# Patient Record
Sex: FEMALE | Race: WHITE | NOT HISPANIC OR LATINO | URBAN - METROPOLITAN AREA
[De-identification: names, ages, dates, MRNs, and addresses within clinical notes are randomized per-mention and may not be internally consistent; named-entity substitution may affect disease eponyms.]

---

## 2024-08-20 ENCOUNTER — OFFICE VISIT (OUTPATIENT)
Dept: ORTHOPEDIC SURGERY | Facility: CLINIC | Age: 39
End: 2024-08-20
Payer: COMMERCIAL

## 2024-08-20 DIAGNOSIS — S86.012A ACHILLES TENDON TEAR, LEFT, INITIAL ENCOUNTER: ICD-10-CM

## 2024-08-20 DIAGNOSIS — S86.112A GASTROCNEMIUS STRAIN, LEFT, INITIAL ENCOUNTER: ICD-10-CM

## 2024-08-20 PROCEDURE — 99213 OFFICE O/P EST LOW 20 MIN: CPT | Performed by: ORTHOPAEDIC SURGERY

## 2024-08-20 PROCEDURE — L4361 PNEUMA/VAC WALK BOOT PRE OTS: HCPCS | Performed by: ORTHOPAEDIC SURGERY

## 2024-08-20 PROCEDURE — L3332 SHOE LIFTS TAPERED TO ONE-HA: HCPCS | Performed by: ORTHOPAEDIC SURGERY

## 2024-08-20 NOTE — H&P (VIEW-ONLY)
"  History of Present Illness  The patient presents with pain and swelling in the posterior aspect of the left ankle.  The patient noted a \"pop\" during the traumatic event.  Ambulation was difficult after.  The patient denies antecedent symptoms.  The patient denies and prior DVT or shortness of breath.  Pain is described as moderate, sharp / worse with activity and better with rest.    She does endorse some tendinitis prior to this which she has been working on stretching and anti-inflammatories.    Past Medical History:   Diagnosis Date    Other fecal abnormalities 11/08/2018    Loose stools     Past Surgical History:   Procedure Laterality Date    OTHER SURGICAL HISTORY  05/16/2019    Clavicle fracture repair     No current outpatient medications on file.    Review of Systems   GENERAL: Negative for malaise, significant weight loss, fever  MUSCULOSKELETAL: see HPI  NEURO:  Negative    Physical Examination:  Left Ankle:  Skin healthy and intact  Swelling and ecchymosis noted  No tenderness to palpation: medial/lateral malleoli, lisfranc joint  + tenderness to palpation over Achilles   Palpable defect noted near muscle tendon junction    Passive ROM - dorsi/plantar flexion, inversion, eversion: within normal limits  Art test demonstrates lack of ankle flexion   Neurovascular exam normal distally  2+ dorsalis pedis pulse and good cap refill    Radiographs:   Deferred    Assessment:  Patient with a left Achilles tendon rupture    Plan:  We discussed Achilles tendon ruptures with the patient.   We reviewed surgical versus non-surgical treatment, including the re-rupture rate, risk of complications (DVT, infection, wound issues), functional outcomes and anticipated return to activities.      Discussed MRI done urgently for further evaluation.    Boot today with heel wedge.        "

## 2024-08-21 ENCOUNTER — HOSPITAL ENCOUNTER (OUTPATIENT)
Dept: RADIOLOGY | Facility: CLINIC | Age: 39
Discharge: HOME | End: 2024-08-21
Payer: COMMERCIAL

## 2024-08-21 DIAGNOSIS — S86.112A GASTROCNEMIUS STRAIN, LEFT, INITIAL ENCOUNTER: ICD-10-CM

## 2024-08-21 DIAGNOSIS — S86.012A ACHILLES TENDON TEAR, LEFT, INITIAL ENCOUNTER: ICD-10-CM

## 2024-08-21 PROCEDURE — 73721 MRI JNT OF LWR EXTRE W/O DYE: CPT | Mod: LT

## 2024-08-27 ENCOUNTER — TELEPHONE (OUTPATIENT)
Dept: ORTHOPEDIC SURGERY | Facility: CLINIC | Age: 39
End: 2024-08-27
Payer: COMMERCIAL

## 2024-08-27 DIAGNOSIS — S86.012A ACHILLES TENDON TEAR, LEFT, INITIAL ENCOUNTER: Primary | ICD-10-CM

## 2024-08-27 NOTE — TELEPHONE ENCOUNTER
Patient's MRI demonstrates a full-thickness tear of the more proximal aspect of the left Achilles tendon.    We discussed Achilles tendon ruptures with the patient.   We reviewed surgical versus non-surgical treatment, including the re-rupture rate, risk of complications (DVT, infection, wound issues), functional outcomes and anticipated return to activities.  The surgical procedure was discussed.  If the patient would like to proceed we will proceed with pre-operative risk stratification.  In the interim; elevation, ice, rest, crutches encouraged.    Based on her age and activity level like to proceed with surgical intervention.

## 2024-08-28 ENCOUNTER — APPOINTMENT (OUTPATIENT)
Dept: ORTHOPEDIC SURGERY | Facility: CLINIC | Age: 39
End: 2024-08-28
Payer: COMMERCIAL

## 2024-08-29 RX ORDER — SODIUM CHLORIDE, SODIUM LACTATE, POTASSIUM CHLORIDE, CALCIUM CHLORIDE 600; 310; 30; 20 MG/100ML; MG/100ML; MG/100ML; MG/100ML
20 INJECTION, SOLUTION INTRAVENOUS CONTINUOUS
Status: CANCELLED | OUTPATIENT
Start: 2024-08-29

## 2024-08-29 RX ORDER — CEFAZOLIN SODIUM 2 G/100ML
2 INJECTION, SOLUTION INTRAVENOUS ONCE
Status: CANCELLED | OUTPATIENT
Start: 2024-08-29 | End: 2024-08-29

## 2024-08-30 ENCOUNTER — HOSPITAL ENCOUNTER (OUTPATIENT)
Facility: HOSPITAL | Age: 39
Setting detail: OUTPATIENT SURGERY
Discharge: HOME | End: 2024-08-30
Attending: ORTHOPAEDIC SURGERY | Admitting: ORTHOPAEDIC SURGERY
Payer: COMMERCIAL

## 2024-08-30 ENCOUNTER — ANESTHESIA (OUTPATIENT)
Dept: OPERATING ROOM | Facility: HOSPITAL | Age: 39
End: 2024-08-30
Payer: COMMERCIAL

## 2024-08-30 ENCOUNTER — ANESTHESIA EVENT (OUTPATIENT)
Dept: OPERATING ROOM | Facility: HOSPITAL | Age: 39
End: 2024-08-30
Payer: COMMERCIAL

## 2024-08-30 VITALS
HEIGHT: 69 IN | RESPIRATION RATE: 16 BRPM | SYSTOLIC BLOOD PRESSURE: 129 MMHG | TEMPERATURE: 97.2 F | OXYGEN SATURATION: 100 % | HEART RATE: 51 BPM | DIASTOLIC BLOOD PRESSURE: 66 MMHG | WEIGHT: 180 LBS | BODY MASS INDEX: 26.66 KG/M2

## 2024-08-30 DIAGNOSIS — S86.012A STRAIN OF LEFT ACHILLES TENDON, INITIAL ENCOUNTER: Primary | ICD-10-CM

## 2024-08-30 DIAGNOSIS — G89.18 POST-OPERATIVE PAIN: ICD-10-CM

## 2024-08-30 LAB — HCG UR QL IA.RAPID: NEGATIVE

## 2024-08-30 PROCEDURE — 2500000004 HC RX 250 GENERAL PHARMACY W/ HCPCS (ALT 636 FOR OP/ED): Mod: JZ | Performed by: STUDENT IN AN ORGANIZED HEALTH CARE EDUCATION/TRAINING PROGRAM

## 2024-08-30 PROCEDURE — 3600000003 HC OR TIME - INITIAL BASE CHARGE - PROCEDURE LEVEL THREE: Performed by: ORTHOPAEDIC SURGERY

## 2024-08-30 PROCEDURE — 27650 REPAIR ACHILLES TENDON: CPT | Performed by: ORTHOPAEDIC SURGERY

## 2024-08-30 PROCEDURE — 2500000004 HC RX 250 GENERAL PHARMACY W/ HCPCS (ALT 636 FOR OP/ED): Mod: JZ | Performed by: ORTHOPAEDIC SURGERY

## 2024-08-30 PROCEDURE — 3700000002 HC GENERAL ANESTHESIA TIME - EACH INCREMENTAL 1 MINUTE: Performed by: ORTHOPAEDIC SURGERY

## 2024-08-30 PROCEDURE — 3700000001 HC GENERAL ANESTHESIA TIME - INITIAL BASE CHARGE: Performed by: ORTHOPAEDIC SURGERY

## 2024-08-30 PROCEDURE — 2500000004 HC RX 250 GENERAL PHARMACY W/ HCPCS (ALT 636 FOR OP/ED): Performed by: NURSE ANESTHETIST, CERTIFIED REGISTERED

## 2024-08-30 PROCEDURE — 2720000007 HC OR 272 NO HCPCS: Performed by: ORTHOPAEDIC SURGERY

## 2024-08-30 PROCEDURE — 81025 URINE PREGNANCY TEST: CPT | Performed by: ORTHOPAEDIC SURGERY

## 2024-08-30 PROCEDURE — 7100000009 HC PHASE TWO TIME - INITIAL BASE CHARGE: Performed by: ORTHOPAEDIC SURGERY

## 2024-08-30 PROCEDURE — 7100000010 HC PHASE TWO TIME - EACH INCREMENTAL 1 MINUTE: Performed by: ORTHOPAEDIC SURGERY

## 2024-08-30 PROCEDURE — 27650 REPAIR ACHILLES TENDON: CPT | Performed by: STUDENT IN AN ORGANIZED HEALTH CARE EDUCATION/TRAINING PROGRAM

## 2024-08-30 PROCEDURE — 7100000002 HC RECOVERY ROOM TIME - EACH INCREMENTAL 1 MINUTE: Performed by: ORTHOPAEDIC SURGERY

## 2024-08-30 PROCEDURE — 3600000008 HC OR TIME - EACH INCREMENTAL 1 MINUTE - PROCEDURE LEVEL THREE: Performed by: ORTHOPAEDIC SURGERY

## 2024-08-30 PROCEDURE — 7100000001 HC RECOVERY ROOM TIME - INITIAL BASE CHARGE: Performed by: ORTHOPAEDIC SURGERY

## 2024-08-30 PROCEDURE — 2500000005 HC RX 250 GENERAL PHARMACY W/O HCPCS: Performed by: NURSE ANESTHETIST, CERTIFIED REGISTERED

## 2024-08-30 RX ORDER — OXYCODONE AND ACETAMINOPHEN 5; 325 MG/1; MG/1
1 TABLET ORAL EVERY 4 HOURS PRN
Status: DISCONTINUED | OUTPATIENT
Start: 2024-08-30 | End: 2024-08-30 | Stop reason: HOSPADM

## 2024-08-30 RX ORDER — BUPIVACAINE HYDROCHLORIDE 5 MG/ML
INJECTION, SOLUTION EPIDURAL; INTRACAUDAL AS NEEDED
Status: DISCONTINUED | OUTPATIENT
Start: 2024-08-30 | End: 2024-08-30 | Stop reason: HOSPADM

## 2024-08-30 RX ORDER — ONDANSETRON HYDROCHLORIDE 2 MG/ML
INJECTION, SOLUTION INTRAVENOUS AS NEEDED
Status: DISCONTINUED | OUTPATIENT
Start: 2024-08-30 | End: 2024-08-30

## 2024-08-30 RX ORDER — SODIUM CHLORIDE, SODIUM LACTATE, POTASSIUM CHLORIDE, CALCIUM CHLORIDE 600; 310; 30; 20 MG/100ML; MG/100ML; MG/100ML; MG/100ML
100 INJECTION, SOLUTION INTRAVENOUS CONTINUOUS
Status: DISCONTINUED | OUTPATIENT
Start: 2024-08-30 | End: 2024-08-30 | Stop reason: HOSPADM

## 2024-08-30 RX ORDER — NEOSTIGMINE METHYLSULFATE 1 MG/ML
INJECTION INTRAVENOUS AS NEEDED
Status: DISCONTINUED | OUTPATIENT
Start: 2024-08-30 | End: 2024-08-30

## 2024-08-30 RX ORDER — HYDRALAZINE HYDROCHLORIDE 20 MG/ML
5 INJECTION INTRAMUSCULAR; INTRAVENOUS EVERY 30 MIN PRN
Status: DISCONTINUED | OUTPATIENT
Start: 2024-08-30 | End: 2024-08-30 | Stop reason: HOSPADM

## 2024-08-30 RX ORDER — ASPIRIN 81 MG/1
81 TABLET ORAL 2 TIMES DAILY
Qty: 28 TABLET | Refills: 0 | Status: SHIPPED | OUTPATIENT
Start: 2024-08-30 | End: 2024-09-13

## 2024-08-30 RX ORDER — LIDOCAINE HYDROCHLORIDE 10 MG/ML
0.1 INJECTION INFILTRATION; PERINEURAL ONCE
Status: DISCONTINUED | OUTPATIENT
Start: 2024-08-30 | End: 2024-08-30 | Stop reason: HOSPADM

## 2024-08-30 RX ORDER — ROCURONIUM BROMIDE 10 MG/ML
INJECTION, SOLUTION INTRAVENOUS AS NEEDED
Status: DISCONTINUED | OUTPATIENT
Start: 2024-08-30 | End: 2024-08-30

## 2024-08-30 RX ORDER — LABETALOL HYDROCHLORIDE 5 MG/ML
5 INJECTION, SOLUTION INTRAVENOUS ONCE AS NEEDED
Status: DISCONTINUED | OUTPATIENT
Start: 2024-08-30 | End: 2024-08-30 | Stop reason: HOSPADM

## 2024-08-30 RX ORDER — DIPHENHYDRAMINE HYDROCHLORIDE 50 MG/ML
12.5 INJECTION INTRAMUSCULAR; INTRAVENOUS ONCE AS NEEDED
Status: DISCONTINUED | OUTPATIENT
Start: 2024-08-30 | End: 2024-08-30 | Stop reason: HOSPADM

## 2024-08-30 RX ORDER — DEXAMETHASONE SODIUM PHOSPHATE 10 MG/ML
INJECTION INTRAMUSCULAR; INTRAVENOUS AS NEEDED
Status: DISCONTINUED | OUTPATIENT
Start: 2024-08-30 | End: 2024-08-30

## 2024-08-30 RX ORDER — CEFAZOLIN SODIUM 2 G/100ML
2 INJECTION, SOLUTION INTRAVENOUS ONCE
Status: COMPLETED | OUTPATIENT
Start: 2024-08-30 | End: 2024-08-30

## 2024-08-30 RX ORDER — OXYCODONE HYDROCHLORIDE 5 MG/1
5 TABLET ORAL EVERY 4 HOURS PRN
Status: DISCONTINUED | OUTPATIENT
Start: 2024-08-30 | End: 2024-08-30 | Stop reason: HOSPADM

## 2024-08-30 RX ORDER — LIDOCAINE HYDROCHLORIDE 20 MG/ML
INJECTION, SOLUTION INFILTRATION; PERINEURAL AS NEEDED
Status: DISCONTINUED | OUTPATIENT
Start: 2024-08-30 | End: 2024-08-30

## 2024-08-30 RX ORDER — MIDAZOLAM HYDROCHLORIDE 1 MG/ML
INJECTION, SOLUTION INTRAMUSCULAR; INTRAVENOUS CONTINUOUS PRN
Status: DISCONTINUED | OUTPATIENT
Start: 2024-08-30 | End: 2024-08-30

## 2024-08-30 RX ORDER — ALBUTEROL SULFATE 0.83 MG/ML
2.5 SOLUTION RESPIRATORY (INHALATION) ONCE AS NEEDED
Status: DISCONTINUED | OUTPATIENT
Start: 2024-08-30 | End: 2024-08-30 | Stop reason: HOSPADM

## 2024-08-30 RX ORDER — SODIUM CHLORIDE, SODIUM LACTATE, POTASSIUM CHLORIDE, CALCIUM CHLORIDE 600; 310; 30; 20 MG/100ML; MG/100ML; MG/100ML; MG/100ML
20 INJECTION, SOLUTION INTRAVENOUS CONTINUOUS
Status: DISCONTINUED | OUTPATIENT
Start: 2024-08-30 | End: 2024-08-30 | Stop reason: HOSPADM

## 2024-08-30 RX ORDER — GLYCOPYRROLATE 0.2 MG/ML
INJECTION INTRAMUSCULAR; INTRAVENOUS AS NEEDED
Status: DISCONTINUED | OUTPATIENT
Start: 2024-08-30 | End: 2024-08-30

## 2024-08-30 RX ORDER — FENTANYL CITRATE 50 UG/ML
INJECTION, SOLUTION INTRAMUSCULAR; INTRAVENOUS AS NEEDED
Status: DISCONTINUED | OUTPATIENT
Start: 2024-08-30 | End: 2024-08-30

## 2024-08-30 RX ORDER — HYDROMORPHONE HYDROCHLORIDE 0.2 MG/ML
0.2 INJECTION INTRAMUSCULAR; INTRAVENOUS; SUBCUTANEOUS EVERY 5 MIN PRN
Status: DISCONTINUED | OUTPATIENT
Start: 2024-08-30 | End: 2024-08-30 | Stop reason: HOSPADM

## 2024-08-30 RX ORDER — ACETAMINOPHEN 325 MG/1
975 TABLET ORAL ONCE
Status: DISCONTINUED | OUTPATIENT
Start: 2024-08-30 | End: 2024-08-30 | Stop reason: HOSPADM

## 2024-08-30 RX ORDER — OXYCODONE AND ACETAMINOPHEN 5; 325 MG/1; MG/1
1 TABLET ORAL EVERY 4 HOURS PRN
Qty: 18 TABLET | Refills: 0 | Status: SHIPPED | OUTPATIENT
Start: 2024-08-30 | End: 2024-09-06

## 2024-08-30 RX ORDER — PROPOFOL 10 MG/ML
INJECTION, EMULSION INTRAVENOUS AS NEEDED
Status: DISCONTINUED | OUTPATIENT
Start: 2024-08-30 | End: 2024-08-30

## 2024-08-30 RX ORDER — ONDANSETRON HYDROCHLORIDE 2 MG/ML
4 INJECTION, SOLUTION INTRAVENOUS ONCE AS NEEDED
Status: DISCONTINUED | OUTPATIENT
Start: 2024-08-30 | End: 2024-08-30 | Stop reason: HOSPADM

## 2024-08-30 SDOH — HEALTH STABILITY: MENTAL HEALTH: CURRENT SMOKER: 0

## 2024-08-30 ASSESSMENT — PAIN SCALES - GENERAL
PAINLEVEL_OUTOF10: 3
PAINLEVEL_OUTOF10: 3
PAINLEVEL_OUTOF10: 0 - NO PAIN
PAINLEVEL_OUTOF10: 3
PAINLEVEL_OUTOF10: 0 - NO PAIN
PAINLEVEL_OUTOF10: 4
PAIN_LEVEL: 0
PAINLEVEL_OUTOF10: 4
PAINLEVEL_OUTOF10: 0 - NO PAIN

## 2024-08-30 ASSESSMENT — PAIN - FUNCTIONAL ASSESSMENT
PAIN_FUNCTIONAL_ASSESSMENT: 0-10

## 2024-08-30 ASSESSMENT — COLUMBIA-SUICIDE SEVERITY RATING SCALE - C-SSRS
2. HAVE YOU ACTUALLY HAD ANY THOUGHTS OF KILLING YOURSELF?: NO
1. IN THE PAST MONTH, HAVE YOU WISHED YOU WERE DEAD OR WISHED YOU COULD GO TO SLEEP AND NOT WAKE UP?: NO
6. HAVE YOU EVER DONE ANYTHING, STARTED TO DO ANYTHING, OR PREPARED TO DO ANYTHING TO END YOUR LIFE?: NO

## 2024-08-30 ASSESSMENT — PAIN DESCRIPTION - DESCRIPTORS
DESCRIPTORS: ACHING
DESCRIPTORS: THROBBING

## 2024-08-30 NOTE — OP NOTE
Operative Note     Date: 2024  OR Location: [facility] OR    Name: Va Finn, : 1985, Age: 39 y.o., MRN: [id number], Sex: female    Diagnosis  Pre-op Diagnosis      * Strain of left Achilles tendon, initial encounter [S86.012A] Post-op Diagnosis     * Strain of left Achilles tendon, initial encounter [S86.012A]     Procedures  Left open achilles tendon repair     Surgeons      * Tony Lay - Primary    Resident/Fellow/Other Assistant:  Surgeons and Role:     * Ray Upton PA-C - Assisting    Procedure Summary  Anesthesia: General  ASA: I  Anesthesia Staff: Anesthesiologist: Eagle Weinstein MD  CRNA: BRIDGET Aviles DNP  C-AA: JULISSA Mclean  Estimated Blood Loss: 10 mL  Intra-op Medications:   Administrations occurring from 1315 to 1445 on 24:   Medication Name Total Dose   lactated Ringer's infusion Cannot be calculated   ceFAZolin (Ancef) 2 g in dextrose (iso)  mL 2 g              Anesthesia Record               Intraprocedure I/O Totals          Intake    lactated Ringer's infusion 600.00 mL    Total Intake 600 mL          Specimen: No specimens collected     Staff:   Circulator: Eneida  Scrub Person: Suzanne Linaresub Person: Yolanda Hughes Circulator: Kylah    Implants:     Findings: see procedure details    Indications:     The patient was seen in the preoperative area. The risks, benefits, complications, treatment options, non-operative alternatives, expected recovery and outcomes were discussed with the patient. The possibilities of reaction to medication, pulmonary aspiration, injury to surrounding structures, bleeding, recurrent infection, the need for additional procedures, failure to diagnose a condition, and creating a complication requiring transfusion or operation were discussed with the patient. The patient concurred with the proposed plan, giving informed consent.  The site of surgery was properly noted/marked if necessary per policy. The patient has been  actively warmed in preoperative area. Preoperative antibiotics ordered and given within 1 hours of incision. Venous thrombosis prophylaxis have been ordered.    The patient was noted to have an acute, full thickness Achilles tendon rupture.  The patient was presented with surgical and non-surgical treatment options.  A review was completed regarding risks of surgery (infection, wound issues, DVT, medical complications, neurologic irritation).  A discussion about strength of repair and re-rupture rate was completed. The patient elected for surgical intervention.    Procedure Details:   The patient was met prior to surgery and the appropriate extremity was marked.  We reviewed recent health history and found no contraindication to proceeding with the planned procedure.  The patient was transported to the operating room and underwent general anesthesia.  The patient was positioned prone on the operative table with all nidia prominences well-padded including care to ensure the ulnar nerve and chest rolls were in a safe position.  No sites of compression were noted.  A sequential compression device was placed on the contralateral leg.  A non-sterile thigh tourniquet was placed.  The positioning was inspected prior to prep and drape to ensure a safe position.    The leg was prepped and draped in the usual sterile fashion.  A timeout was completed and antibiotic administration was in accordance with standard protocol.  The leg was exsanguinated using an Esmarch bandage and the tourniquet was inflated.      A longitudinal incision was made along the medial aspect of the tendon.  The dissection was carried out through the skin and subcutaneous tissue.  The paratenon was split and gently retracted.  All handling of the skin and soft tissue was delicate.  The tendon rupture was encountered.  The edges of the tendon were cleaned up with a 15 blade scalpel until healthy margins were obtained.    A #2 fiberwire was used to place  a Krackow stitch in the tendon distally and 2 fiber tapes were used proximally at the muscle tendon junction.  We used 2 sutures on each side so a total of 4 limbs on each side of the tendon.  A free needle was used to pass the sutures into the opposite side and gift box technique was used for the repair.  Tension was held as the sutures were tied to ensure no gapping at the repair site.    The knots were buried using a free needle by passing them into the undersurface of the tendon.    The wound was closed meticulously using a 3-0 vicryl in the paratenon and dermis. Monofilament suture was used to repair the skin.  The ankle was dressed in sterile webril and placed in a posterior splint in resting equinus.    I was present and participated in the entire procedure. The Physician Assistant participated in all critical elements of the procedure under my direct supervision. The surgical incision was closed by the PA under my direct supervision.   No qualified residents were available to assist.    The physician assistant was present for the entire case.  Given the nature of the procedure and disease process, a skilled surgical assistant was necessary for this case.  The assistant was necessary for retraction and helped directly facilitate completion of the surgery.  A certified scrub tech was at the back table managing instruments and supplies for the surgical procedure.    Complications:  None; patient tolerated the procedure well.    Disposition: PACU - hemodynamically stable.  Condition: stable         Tony Lay  Phone Number: [phone]

## 2024-08-30 NOTE — ANESTHESIA PROCEDURE NOTES
Airway  Date/Time: 8/30/2024 2:24 PM  Urgency: elective    Airway not difficult    Staffing  Performed: CRNA   Authorized by: Eagle Weinstein MD    Performed by: ROXANNA Aviles-CRNA, CANDE  Patient location during procedure: OR    Indications and Patient Condition  Indications for airway management: anesthesia and airway protection  Spontaneous ventilation: present  Sedation level: deep  Preoxygenated: yes  Patient position: sniffing  Mask difficulty assessment: 1 - vent by mask    Final Airway Details  Final airway type: endotracheal airway      Successful airway: ETT  Cuffed: yes   Successful intubation technique: video laryngoscopy  Facilitating devices/methods: intubating stylet  Endotracheal tube insertion site: oral  ETT size (mm): 7.0  Cormack-Lehane Classification: grade I - full view of glottis  Placement verified by: capnometry   Measured from: lips  ETT to lips (cm): 22  Number of attempts at approach: 1

## 2024-08-30 NOTE — ANESTHESIA POSTPROCEDURE EVALUATION
Patient: Va Finn    Procedure Summary       Date: 08/30/24 Room / Location: [facility] OR 02 / [facility] OR    Anesthesia Start: 1414 Anesthesia Stop:     Procedure: OPEN REPAIR ACHILLES (Left: Ankle) Diagnosis:       Strain of left Achilles tendon, initial encounter      (Strain of left Achilles tendon, initial encounter [S86.012A])    Surgeons: Tony Lay MD Responsible Provider: Eagle Weinstein MD    Anesthesia Type: general ASA Status: 1            Anesthesia Type: general    Vitals Value Taken Time   /69 08/30/24 1547   Temp 36.2 08/30/24 1547   Pulse 65 08/30/24 1547   Resp 10 08/30/24 1547   SpO2 99 08/30/24 1547       Anesthesia Post Evaluation    Patient location during evaluation: PACU  Patient participation: complete - patient participated  Level of consciousness: awake and alert  Pain score: 0  Pain management: adequate  Multimodal analgesia pain management approach  Airway patency: patent  Two or more strategies used to mitigate risk of obstructive sleep apnea  Cardiovascular status: acceptable  Respiratory status: acceptable  Hydration status: acceptable  Postoperative Nausea and Vomiting: none      There were no known notable events for this encounter.

## 2024-08-30 NOTE — ANESTHESIA PROCEDURE NOTES
Peripheral Block    Patient location during procedure: pre-op  Start time: 8/30/2024 1:33 PM  End time: 8/30/2024 1:36 PM  Reason for block: at surgeon's request and post-op pain management  Staffing  Performed: attending   Authorized by: Eagle Weinstein MD    Performed by: Eagle Weinstein MD  Preanesthetic Checklist  Completed: patient identified, IV checked, site marked, risks and benefits discussed, surgical consent, monitors and equipment checked, pre-op evaluation and timeout performed   Timeout performed at: 8/30/2024 1:31 PM  Peripheral Block  Patient position: left lateral decubitus  Prep: ChloraPrep  Block type: sciatic  Laterality: left  Injection technique: single-shot  Guidance: ultrasound guided  Local infiltration: bupivicaine  Infiltration strength: 0.5 %  Dose: 25 mL  Needle  Needle type: short-bevel   Needle gauge: 21 G  Needle length: 8 cm  Needle localization: ultrasound guidance  Assessment  Injection assessment: negative aspiration for heme, no paresthesia on injection, incremental injection and local visualized surrounding nerve on ultrasound

## 2024-08-30 NOTE — ANESTHESIA PREPROCEDURE EVALUATION
Patient: Va Finn    Procedure Information       Date/Time: 08/30/24 1325    Procedure: OPEN REPAIR ACHILLES (Left: Ankle)    Location: [facility] OR 07 / [facility] OR    Surgeons: Tony Lay MD            Relevant Problems   No relevant active problems       Clinical information reviewed:   Tobacco  Allergies  Meds   Med Hx  Surg Hx  OB Status  Fam Hx  Soc   Hx        NPO Detail:  NPO/Void Status  Carbohydrate Drink Given Prior to Surgery? : N  Date of Last Liquid: 08/29/24  Time of Last Liquid: 2300  Date of Last Solid: 08/29/24  Time of Last Solid: 2130  Last Intake Type: Clear fluids  Time of Last Void: 1200         Physical Exam    Airway  Mallampati: II  TM distance: >3 FB  Neck ROM: full     Cardiovascular   Rhythm: regular  Rate: normal     Dental - normal exam     Pulmonary   Breath sounds clear to auscultation     Abdominal        Anesthesia Plan    History of general anesthesia?: yes  History of complications of general anesthesia?: no    ASA 1     general     The patient is not a current smoker.    intravenous induction   Postoperative administration of opioids is intended.  Anesthetic plan and risks discussed with patient.    Plan discussed with CAA.

## 2024-08-30 NOTE — DISCHARGE INSTRUCTIONS
Post-Operative Instructions - Achilles Tendon / Ankle Ligament Repair  Dr. Tony Lay    Activity / Swelling:  Non-weight bearing: You may not bear any weight on your operative leg. Use crutches or walker at ALL times until follow-up visit.    Elevate the limb as much as possible for the first 48-72 hours after surgery. Some swelling is normal after surgery and often extends into toes. Increased swelling and increased pain are indications of too much activity. Use ice and elevation as needed to help decrease swelling and pain.  Ice ~ 20 min per hour, do not apply directly to skin.    Diet:  Gradually resume your normal diet as tolerated following surgery.  The evening of your surgical day, begin with liquids and/or light foods.  If you are feeling well enough the next morning, progress to your normal eating patterns    Dressing care /Showering / Hygiene:  You may shower, but keep the splint and dressing dry!  Recommendations include a plastic bag with tape or a cast cover to keep splint dry. Report any major wetness to the office immediately.    Medications:  Pain:  You will be given a prescription for pain medication after your surgery.  It should be taken as needed only.  The goal is to discontinue it as quickly as possible.  DO NOT drive or operate heavy machinery while taking this medication as it will make you drowsy.      You may want to consider also taking over-the-counter stool softener and/or laxative (Colace, Senekot or Dulcolax). These medications should be taken as directed and only short term.    Prevention of Blood Clots:  81 mg of aspirin (over the counter) to start the day after surgery for 2 weeks.  If you have a history of blood clots you may receive a different prescription (for example: lovenox, xarelto, eliquis)      Physical Therapy:         Will be discussed at first follow-up appointment.    Follow-up:         Generally 10-14 days post-op.      Call with any concerns, especially  signs of infection (fever, drainage) or shortness of breath.    Phone: [phone]

## 2024-09-09 ENCOUNTER — OFFICE VISIT (OUTPATIENT)
Dept: ORTHOPEDIC SURGERY | Facility: CLINIC | Age: 39
End: 2024-09-09
Payer: COMMERCIAL

## 2024-09-09 DIAGNOSIS — S86.012A ACHILLES TENDON TEAR, LEFT, INITIAL ENCOUNTER: ICD-10-CM

## 2024-09-09 PROCEDURE — 1036F TOBACCO NON-USER: CPT | Performed by: ORTHOPAEDIC SURGERY

## 2024-09-09 PROCEDURE — 99211 OFF/OP EST MAY X REQ PHY/QHP: CPT | Performed by: ORTHOPAEDIC SURGERY

## 2024-09-09 PROCEDURE — 99024 POSTOP FOLLOW-UP VISIT: CPT | Performed by: ORTHOPAEDIC SURGERY

## 2024-09-09 ASSESSMENT — PATIENT HEALTH QUESTIONNAIRE - PHQ9
SUM OF ALL RESPONSES TO PHQ9 QUESTIONS 1 AND 2: 0
2. FEELING DOWN, DEPRESSED OR HOPELESS: NOT AT ALL
1. LITTLE INTEREST OR PLEASURE IN DOING THINGS: NOT AT ALL

## 2024-09-09 NOTE — PROGRESS NOTES
History of Present Illness   The patient returns today denying any significant pain. No fever or chills.  No drainage noted.  Overall she is doing very well, minimal pain at this point.    Review of Systems   GENERAL: Negative for malaise, significant weight loss, fever  MUSCULOSKELETAL: see HPI  NEURO:  Negative    Physical Examination:  Incision healing nicely, no erythema or drainage  Achilles tendon palpable throughout its course.  + Plantar flexion with gentle Art´s test  Sensation intact throughout foot  Good cap refill    Assessment:  Patient status post left Achilles tendon repair.    Plan:  Encouraged elevation, ice.  Immobilization: splint x 2 weeks, the partial weight bearing in boot for 4 weeks, followed by weight bearing as tolerated in boot for 6 weeks  Discussed rehab goals / PT protocol   Follow-up 4 weeks       Ray Upton PA-C     In a face to face encounter, I evaluated the patient and performed a physical examination, discussed pertinent diagnostic studies if indicated and discussed diagnosis and management strategies with both the patient and physician assistant / nurse practitioner.  I reviewed the PA/NP's note and agree with the documented findings and plan of care.        Tony Lay MD              POST-OPERATIVE PHYSICAL THERAPY  ACHILLES TENDON REPAIR    NWB in plaster splint/cast in plantar flexion for 10 - 14 days.    At 2 weeks  Switch to cam walker boot (neutral-foot flat or heel build up).  PWB with crutches until 6 weeks.  Walk with foot flat - NO active plantar flexion  Remove cam boot each day for active dorsi flexion to neutral; passive plantar flexion.  NO passive heelcord stretching.  Can use exercise bike with cam boot on.  Active inversion and eversion R.O.M.    At 6 weeks:  Begin active plantar flexion - begin with isometrics, progress to isotonics.  May begin inversion and eversion exercises.  May initiate some theraband exercises on gradual basis.  May  stationary bike in boot.  Dorsiflexion isotonics.  Achilles tendon stretch with towel.  R.O.M. exercises.  Advance toward FWB at by 8 - 10 weeks postop.  Wear cam boot up to 8 - 10 weeks post-op.  Can use high top shoe after cam boot.    At 12 weeks:  Continue plantar flexion and dorsiflexion isotonics.  Add isokinetics.  Continue inversion/eversion isotonics.  Proprioception training.  Retro program, rob gordon.

## 2024-09-10 NOTE — ADDENDUM NOTE
Addendum  created 09/10/24 1113 by Eagle Weinstein MD    Clinical Note Signed, Intraprocedure Blocks edited, SmartForm saved

## 2024-09-25 ENCOUNTER — EVALUATION (OUTPATIENT)
Dept: PHYSICAL THERAPY | Facility: CLINIC | Age: 39
End: 2024-09-25
Payer: COMMERCIAL

## 2024-09-25 DIAGNOSIS — S86.012A ACHILLES TENDON TEAR, LEFT, INITIAL ENCOUNTER: ICD-10-CM

## 2024-09-25 DIAGNOSIS — M25.572 ACUTE LEFT ANKLE PAIN: Primary | ICD-10-CM

## 2024-09-25 PROCEDURE — 97140 MANUAL THERAPY 1/> REGIONS: CPT | Mod: GP | Performed by: PHYSICAL THERAPIST

## 2024-09-25 PROCEDURE — 97110 THERAPEUTIC EXERCISES: CPT | Mod: GP | Performed by: PHYSICAL THERAPIST

## 2024-09-25 PROCEDURE — 97161 PT EVAL LOW COMPLEX 20 MIN: CPT | Mod: GP | Performed by: PHYSICAL THERAPIST

## 2024-09-25 ASSESSMENT — PATIENT HEALTH QUESTIONNAIRE - PHQ9
1. LITTLE INTEREST OR PLEASURE IN DOING THINGS: NOT AT ALL
2. FEELING DOWN, DEPRESSED OR HOPELESS: NOT AT ALL
SUM OF ALL RESPONSES TO PHQ9 QUESTIONS 1 AND 2: 0

## 2024-09-25 ASSESSMENT — COLUMBIA-SUICIDE SEVERITY RATING SCALE - C-SSRS
2. HAVE YOU ACTUALLY HAD ANY THOUGHTS OF KILLING YOURSELF?: NO
6. HAVE YOU EVER DONE ANYTHING, STARTED TO DO ANYTHING, OR PREPARED TO DO ANYTHING TO END YOUR LIFE?: NO
1. IN THE PAST MONTH, HAVE YOU WISHED YOU WERE DEAD OR WISHED YOU COULD GO TO SLEEP AND NOT WAKE UP?: NO

## 2024-09-25 ASSESSMENT — ENCOUNTER SYMPTOMS
DEPRESSION: 0
OCCASIONAL FEELINGS OF UNSTEADINESS: 0
LOSS OF SENSATION IN FEET: 0

## 2024-09-25 NOTE — PROGRESS NOTES
"PHYSICAL THERAPY   EVALUATION & TREATMENT NOTE    Patient Name:  Va Finn   Patient MRN: [id number]  Date: 9/25/2024    Time Calculation  Start Time: 1145  Stop Time: 1222  Time Calculation (min): 37 min    Insurance:  Insurance Type: [employer]  Visit number: 1  Approved # of visits 60  Authorization Needed: No    General   Reason for visit: L Achilles repair 8/30/24   Referred by: Tony Lay    Therapy diagnoses:   Problem List Items Addressed This Visit             ICD-10-CM    Achilles tendon tear, left, initial encounter S86.012A    Acute left ankle pain - Primary M25.572    Relevant Orders    Follow Up In Physical Therapy       ASSESSMENT   38 y/o s/p L Achilles repair 8/30/24 presenting to PT today with normal post op changes including decreased L ankle ROM and strength, edema, and TTP of affected area limiting her ability to perform all her usual ADLs, ambulate without boot or crutches, and participate in sports. Patient will benefit from skilled therapy to address these impairments and return to prior level of functioning.     PLAN   Goals  1. Pt will be independent in HEP in 6-8 weeks  2. Pt will report 0-1/10 L ankle pain at rest and with activity in 6-8 weeks.  3. Pt will demonstrate 5/5 B LE strength to return to running in 12-16 weeks  4. Pt will demonstrate full L ankle ROM to normalize gait in 8-12 weeks.  5. Pt will report LEFS score > 70/80 in 12-16 weeks.  6. Pt will be able to SLS on surgical leg for > 10\" in 6-8 weeks to decrease fall risk.    Plan of care to include: therapeutic exercise, therapeutic activity, soft tissue mobilization, joint mobilizations, neuromuscular re-education, pt education, self care activities, home program, vaso/cryotherapy, gait training, dry needling    1x/week for 12-16 weeks.    Patient agrees to plan of care.    SUBJECTIVE   Reviewed medical history form with patient and medical screening assessed     Tore her L Achilles pushing off during a flag football game " on 8/17/24. Surgery was 8/30/24. Splint 10 days, and then boot since then unless she's sitting on the couch. Most nights is sleeping in it. Boot has 1 lift in it. Has been doing some ankle ROM.   Had L ankle pain for a year on and off for about a year.     Pain:  L Achilles pain, achey. Lateral L knee has been hurting since the surgery too.   At worst, pain is 3/10  Exacerbating factors include weight bearing too much   At best, pain is 0/10  Relieving factors include ibuprofen occasionally, rest    Function:  IND in ADLS, has been modifying the way she's been doing most of her household chores.   Sleep - boot will sometimes wake her.   Lives alone but has a lot of friends/family around to help    Work: , drives to work 2x/week.     Social:  Exercise - weight lifting (is back to upper body stuff), jogging a few days a week, plays soccer & flag football    Pt goals: return to sports & normal activities    Language: English  Potential barriers to treatment: continue to assess    Precautions:    0-6 weeks: PWB in boot, DF to neutral, bike with boot (10/11/24)  6-12 weeks: WBAT in boot at first, wean from boot around week 10, theraband exercises  12 weeks: progress strength as tolerated  PMH: none  Fall risk -  low 2/2 surgical status      OBJECTIVE *=painful  Gait Ambulates with boot PWB and B axillary crutches    Observation/Posture  Incision- healing without signs of infection    Palpation  (+) TTP around L incision    Range of Motion (R, L in degrees)  Ankle Dorsiflexion 20 , 0  Ankle Plantarflexion 55 ,45  Ankle Inversion 40, 40  Ankle Eversion  15, 15    Flexibility (R, L)  Hamstrings WNL, min dec  Quads min dec, min dec    Strength (R, L MMT out of 5)  Hip Extension 5, 4+  Hip Abduction 4+, 4    Outcome Measures  LEFS = 45/80    Today's treatment and initial evaluation included:  - Patient education regarding diagnosis, prognosis, contributing factors, comorbidities, activity modification, symptom  monitoring, importance of HEP, role of PT, postural re-education, appropriate shoe wear/boot use/crutch use, work ergonomics, body mechanics, return to sport, reviewed office cancel/no show policy.  - Review of POC   - Therapeutic Exercise & given HEP handout:  Access Code: [email or web address]  - Seated Toe Flexion Extension PROM  - 3 x daily - 10 reps - 10 sec hold  - Ankle Pumps in Elevation  - 3 x daily - 20 reps - 3 sec hold  - Supine Ankle Inversion Eversion AROM  - 3 x daily - 20 reps - 3 sec hold  - Sidelying Diagonal Hip Abduction  - 3 x daily - 30 reps  - Sidelying Hip Adduction  - 3 x daily - 30 reps  - Supine Bridge  - 3 x daily - 20 reps - 5 sec hold    Manual Therapy  STM/DTM L gastroc, soleus, Achilles  PT Evaluation Time Entry  PT Evaluation (Low) Time Entry: 14  PT Therapeutic Procedures Time Entry  Manual Therapy Time Entry: 15  Therapeutic Exercise Time Entry: 8

## 2024-10-11 ENCOUNTER — TREATMENT (OUTPATIENT)
Dept: PHYSICAL THERAPY | Facility: CLINIC | Age: 39
End: 2024-10-11
Payer: COMMERCIAL

## 2024-10-11 DIAGNOSIS — M25.572 ACUTE LEFT ANKLE PAIN: Primary | ICD-10-CM

## 2024-10-11 PROCEDURE — 97016 VASOPNEUMATIC DEVICE THERAPY: CPT | Mod: GP | Performed by: PHYSICAL THERAPIST

## 2024-10-11 PROCEDURE — 97110 THERAPEUTIC EXERCISES: CPT | Mod: GP | Performed by: PHYSICAL THERAPIST

## 2024-10-11 PROCEDURE — 97140 MANUAL THERAPY 1/> REGIONS: CPT | Mod: GP | Performed by: PHYSICAL THERAPIST

## 2024-10-11 NOTE — PROGRESS NOTES
"PHYSICAL THERAPY   TREATMENT NOTE    Patient Name:  Va Finn   Patient MRN: [id number]  Date: 10/11/2024    Time Calculation  Start Time: 0845  Stop Time: 0937  Time Calculation (min): 52 min    Insurance:  Insurance Type: [place]  Visit number: 2    Approved # of visits 60  Authorization Needed: No     General   Reason for visit: L Achilles repair 8/30/24   Referred by: Tony Lay       Therapy diagnoses:   Problem List Items Addressed This Visit             ICD-10-CM    Acute left ankle pain - Primary M25.572       Assessment:  Pt is 6 weeks post op today and doing very well at this time. DF ROM beyond neutral before she starts to feel gentle stretch. Updated HEP to reflect progress. Continues to be somewhat tight and tender around proximal incision.    Plan: WBAT in boot, continue progressing gentle LE strength as tolerated      Subjective  Has been using a rowing machine with her boot on. Doing her PT most days. Is seeing the doctor next week. Knee is feeling better. Using one crutch   - Pain (0-10): 0/10 ankle pain.     Precautions  0-6 weeks: PWB in boot, DF to neutral, bike with boot (10/11/24)  6-12 weeks: WBAT in boot at first, wean from boot around week 10, theraband exercises  12 weeks: progress strength as tolerated  PMH: none  Fall risk -  low 2/2 surgical status    Objective  HEP Access Code: [email or web address] (seated toe flex/ext PROM, ankle 4 way ROM, S/L hip add, bridge, SLS, squats, standing hip abd/ext green  Treatment Performed:   Therapeutic Exercise:    - R stepper L4 x 6'  - gait in boot without heel lifts x 60'  - SLS x 30\" L x 3  - squats x 15   - standing hip abd green x 10 R/L  - standing hip ext green x 10 R/L  - ankle 4 way blue band x 15 L    Manual Therapy:   STM/DTM L Achilles, gastroc, soleus  L ankle PROM    Modalities: minutes  Vaso 34 degrees L ankle high compression x 10'      PT Therapeutic Procedures Time Entry  Manual Therapy Time Entry: 12  Therapeutic Exercise Time Entry: 30  PT " Modalities Time Entry  Vasopneumatic Devices Time Entry: 10

## 2024-10-15 ENCOUNTER — OFFICE VISIT (OUTPATIENT)
Dept: ORTHOPEDIC SURGERY | Facility: CLINIC | Age: 39
End: 2024-10-15
Payer: COMMERCIAL

## 2024-10-15 DIAGNOSIS — G89.18 POST-OP PAIN: Primary | ICD-10-CM

## 2024-10-15 PROCEDURE — 1036F TOBACCO NON-USER: CPT | Performed by: STUDENT IN AN ORGANIZED HEALTH CARE EDUCATION/TRAINING PROGRAM

## 2024-10-15 PROCEDURE — 99024 POSTOP FOLLOW-UP VISIT: CPT | Performed by: STUDENT IN AN ORGANIZED HEALTH CARE EDUCATION/TRAINING PROGRAM

## 2024-10-15 PROCEDURE — 99211 OFF/OP EST MAY X REQ PHY/QHP: CPT | Performed by: STUDENT IN AN ORGANIZED HEALTH CARE EDUCATION/TRAINING PROGRAM

## 2024-10-15 NOTE — PROGRESS NOTES
History of Present Illness   The patient returns today denying any significant pain and notes improvement in pain and swelling. She is doing well overall and has very few questions.     Review of Systems   GENERAL: Negative for malaise, significant weight loss, fever  MUSCULOSKELETAL: see HPI  NEURO:  Negative    Physical Examination:  Incision healed nicely  Achilles tendon palpable throughout its course.  + Plantar flexion with gentle Art´s test  Sensation intact throughout foot  Good cap refill    Assessment:   Patient status post left Achilles tendon repair.    Plan:  Discussed activity level / timeframe for transition from boot to normal footwear  Reviewed risk of re-injury / activities to avoid  Discussed rehab goals / PT protocol and protracted course to regain strength  Follow-up: 6-8 weeks    Ray Upton PA-C              POST-OPERATIVE PHYSICAL THERAPY  ACHILLES TENDON REPAIR    NWB in plaster splint/cast in plantar flexion for 10 - 14 days.    At 2 weeks  Switch to cam walker boot (neutral-foot flat or heel build up).  PWB with crutches until 6 weeks.  Walk with foot flat - NO active plantar flexion  Remove cam boot each day for active dorsi flexion to neutral; passive plantar flexion.  NO passive heelcord stretching.  Can use exercise bike with cam boot on.  Active inversion and eversion R.O.M.    At 6 weeks:  Begin active plantar flexion - begin with isometrics, progress to isotonics.  May begin inversion and eversion exercises.  May initiate some theraband exercises on gradual basis.  May stationary bike in boot.  Dorsiflexion isotonics.  Achilles tendon stretch with towel.  R.O.M. exercises.  Advance toward FWB at by 8 - 10 weeks postop.  Wear cam boot up to 8 - 10 weeks post-op.  Can use high top shoe after cam boot.    At 12 weeks:  Continue plantar flexion and dorsiflexion isotonics.  Add isokinetics.  Continue inversion/eversion isotonics.  Proprioception training.  Retro program,  [facility].

## 2024-10-18 ENCOUNTER — TREATMENT (OUTPATIENT)
Dept: PHYSICAL THERAPY | Facility: CLINIC | Age: 39
End: 2024-10-18
Payer: COMMERCIAL

## 2024-10-18 DIAGNOSIS — M25.572 ACUTE LEFT ANKLE PAIN: ICD-10-CM

## 2024-10-18 PROCEDURE — 97140 MANUAL THERAPY 1/> REGIONS: CPT | Mod: GP | Performed by: PHYSICAL THERAPIST

## 2024-10-18 PROCEDURE — 97110 THERAPEUTIC EXERCISES: CPT | Mod: GP | Performed by: PHYSICAL THERAPIST

## 2024-10-18 PROCEDURE — 97016 VASOPNEUMATIC DEVICE THERAPY: CPT | Mod: GP | Performed by: PHYSICAL THERAPIST

## 2024-10-18 NOTE — PROGRESS NOTES
PHYSICAL THERAPY   TREATMENT NOTE    Patient Name:  Va Finn   Patient MRN: [id number]  Date: 10/18/2024    Time Calculation  Start Time: 1020  Stop Time: 1108  Time Calculation (min): 48 min    Insurance:  Insurance Type: [place]  Visit number: 3    Approved # of visits 60  Authorization Needed: No     General   Reason for visit: L Achilles repair 8/30/24   Referred by: Tony Lay       Therapy diagnoses:   Problem List Items Addressed This Visit             ICD-10-CM    Acute left ankle pain M25.572       Assessment:  PT progressing very well at this time. Noted decreased stability on B LE with cable column exercises. BAPS L1 felt good without increased pain and minimal stretching - can progress to L2 next week and start some weight bearing in shoe.     Plan: Start some WB activities in shoe next time, continue progressing gentle LE strength as tolerated      Subjective  Continuing with PT HEP and feeling good at this point. Saw doctor who confirmed she is doing well, told her she could sleep without the boot and is walking without crutches now. Continue with boot until week 10. Went to the baseball game yesterday and walked   - Pain (0-10): 0/10 ankle pain.     Precautions  6-12 weeks: WBAT in boot, wean from boot weeks 8-10 (10/25 is week 8), theraband exercises  12 weeks: progress strength as tolerated  PMH: none  Fall risk -  low 2/2 surgical status    Objective  HEP Access Code: [email or web address] (seated toe flex/ext PROM, ankle 4 way ROM, S/L hip add, bridge, SLS, squats, standing hip abd/ext green  Treatment Performed:   Therapeutic Exercise:    - R stepper L6 x 7'  - cable column 7.5# 4 way hip x 10 R/L  - seated heel raises x 30   - BAPS L1 ankle PF/DF x 20 // inv/ever x 20 // circles CW/CCW x 20 L  - SL bridge x 10 R/L     Manual Therapy:   STM/DTM L Achilles, gastroc, soleus  L ankle PROM    Modalities: minutes  Vaso 34 degrees L ankle high compression x 10'      PT Therapeutic Procedures Time Entry  Manual  Therapy Time Entry: 10  Therapeutic Exercise Time Entry: 28  PT Modalities Time Entry  Vasopneumatic Devices Time Entry: 10

## 2024-10-25 ENCOUNTER — TREATMENT (OUTPATIENT)
Dept: PHYSICAL THERAPY | Facility: CLINIC | Age: 39
End: 2024-10-25
Payer: COMMERCIAL

## 2024-10-25 DIAGNOSIS — M25.572 ACUTE LEFT ANKLE PAIN: ICD-10-CM

## 2024-10-25 PROCEDURE — 97016 VASOPNEUMATIC DEVICE THERAPY: CPT | Mod: GP | Performed by: PHYSICAL THERAPIST

## 2024-10-25 PROCEDURE — 97110 THERAPEUTIC EXERCISES: CPT | Mod: GP | Performed by: PHYSICAL THERAPIST

## 2024-10-25 PROCEDURE — 97140 MANUAL THERAPY 1/> REGIONS: CPT | Mod: GP | Performed by: PHYSICAL THERAPIST

## 2024-10-25 NOTE — PROGRESS NOTES
"PHYSICAL THERAPY   TREATMENT NOTE    Patient Name:  Va Finn   Patient MRN: [id number]  Date: 10/25/2024    Time Calculation  Start Time: 0843  Stop Time: 0938  Time Calculation (min): 55 min    Insurance:  Insurance Type: [place]  Visit number: 4    Approved # of visits 60  Authorization Needed: No     General   Reason for visit: L Achilles repair 8/30/24   Referred by: Tony Lay       Therapy diagnoses:   Problem List Items Addressed This Visit             ICD-10-CM    Acute left ankle pain M25.572       Assessment:  Instructed pt to start doing her HEP out of the boot. Tolerates exercises today out of the boot well. Instructed her on proper boot weaning (start only out of the boot around the house, focus on proper gait mechanics with toe forward but keeping step small, put boot back on with increased pain).    Plan: Assess boot wean, continue progressing gentle LE strength as tolerated      Subjective  Has been doing her HEP without issues. Still feels her calf cramp up when she does her heel raises   - Pain (0-10): 0/10 ankle pain.     Precautions  6-12 weeks: wean from boot weeks 8-10 (10/25 is week 8), theraband exercises  12 weeks: progress strength as tolerated  PMH: none  Fall risk -  low 2/2 surgical status    Objective  HEP Access Code: [email or web address] (seated toe flex/ext PROM, ankle 4 way ROM, S/L hip add, bridge, SLS, squats, standing hip abd/ext green  Treatment Performed:   Therapeutic Exercise:    - R stepper L6 x 8'  - gastroc stretch x 30\" R/L *  - SLS EO L x 30\" / EC R x 30\" x 2 each  - standing hip abd/ext green x 15 R/L x 2  - squats x 15  - SLS airex x 30\" R/L    Manual Therapy:   STM/DTM L Achilles, gastroc, soleus    Modalities: minutes  Vaso 34 degrees L ankle high compression x 10'      PT Therapeutic Procedures Time Entry  Manual Therapy Time Entry: 10  Therapeutic Exercise Time Entry: 35  PT Modalities Time Entry  Vasopneumatic Devices Time Entry: 10               "

## 2024-11-01 ENCOUNTER — TREATMENT (OUTPATIENT)
Dept: PHYSICAL THERAPY | Facility: CLINIC | Age: 39
End: 2024-11-01
Payer: COMMERCIAL

## 2024-11-01 DIAGNOSIS — M25.572 ACUTE LEFT ANKLE PAIN: ICD-10-CM

## 2024-11-01 PROCEDURE — 97110 THERAPEUTIC EXERCISES: CPT | Mod: GP | Performed by: PHYSICAL THERAPIST

## 2024-11-01 PROCEDURE — 97016 VASOPNEUMATIC DEVICE THERAPY: CPT | Mod: GP | Performed by: PHYSICAL THERAPIST

## 2024-11-01 PROCEDURE — 97140 MANUAL THERAPY 1/> REGIONS: CPT | Mod: GP | Performed by: PHYSICAL THERAPIST

## 2024-11-08 ENCOUNTER — TREATMENT (OUTPATIENT)
Dept: PHYSICAL THERAPY | Facility: CLINIC | Age: 39
End: 2024-11-08
Payer: COMMERCIAL

## 2024-11-08 DIAGNOSIS — M25.572 ACUTE LEFT ANKLE PAIN: ICD-10-CM

## 2024-11-08 PROCEDURE — 97016 VASOPNEUMATIC DEVICE THERAPY: CPT | Mod: GP | Performed by: PHYSICAL THERAPIST

## 2024-11-08 PROCEDURE — 97110 THERAPEUTIC EXERCISES: CPT | Mod: GP | Performed by: PHYSICAL THERAPIST

## 2024-11-08 PROCEDURE — 97140 MANUAL THERAPY 1/> REGIONS: CPT | Mod: GP | Performed by: PHYSICAL THERAPIST

## 2024-11-08 NOTE — PROGRESS NOTES
"PHYSICAL THERAPY   TREATMENT NOTE    Patient Name:  Va Finn   Patient MRN: [id number]  Date: 11/8/2024    Time Calculation  Start Time: 0803  Stop Time: 0853  Time Calculation (min): 50 min    Insurance:  Insurance Type: [place]  Visit number: 6    Approved # of visits 60  Authorization Needed: No     General   Reason for visit: L Achilles repair 8/30/24   Referred by: Tony Lay       Therapy diagnoses:   Problem List Items Addressed This Visit             ICD-10-CM    Acute left ankle pain M25.572       Assessment:  Pt progressing well at this time with significant fatigue overall with exercises and needs cues to push weight into toes during balance exercises. Some increased swelling today as expected with increased activity - encouraged her to elevate and compress and ice more.     Plan: Continue progressing gentle LE strength as tolerated      Subjective  Wore her boot to [employer] last weekend but aside from that has been out of the boot. More swelling this week, but has been icing and wrapping. HEP going well. Has done the rower a few times. Would normally bike or jog a few times a week.  - Pain (0-10): 0/10 ankle pain     Precautions - 10 weeks post op on 11/8  6-12 weeks: wean from boot weeks 8-10 (10/25 is week 8), theraband exercises  12 weeks: progress strength as tolerated  PMH: none  Fall risk -  low 2/2 surgical status    Objective  HEP Access Code: [email or web address] (seated toe flex/ext PROM, ankle 4 way ROM, S/L hip add, bridge, SLS, squats, standing hip abd/ext green)  Antalgic gait with decreased step length   Treatment Performed:   Therapeutic Exercise:    - R bike L4 x 6'  - dynamic LE - KTC, quads, toe touch, closed gate  - SL heel raises on shuttle 37# x 15 L // 31# x 15 L // 25# x 15 L  - sidesteps blue x 30' R/L x 2  - monsters blue x 30' for/rev x 2  - 8\" lateral heel taps x 15 R/L x 2  - airex lateral lunge on slider x 10 R/L x 2  - SLS on airex with blue rebounder x 20 R/L  - gastroc stretch " "on slant board x 60\" // soleus stretch on slant board x 60\"     Manual Therapy:   STM/DTM L Achilles, gastroc, soleus    Modalities: minutes  Vaso 34 degrees L ankle high compression x 10'      PT Therapeutic Procedures Time Entry  Manual Therapy Time Entry: 10  Therapeutic Exercise Time Entry: 30  PT Modalities Time Entry  Vasopneumatic Devices Time Entry: 10               "

## 2024-11-15 ENCOUNTER — TREATMENT (OUTPATIENT)
Dept: PHYSICAL THERAPY | Facility: CLINIC | Age: 39
End: 2024-11-15
Payer: COMMERCIAL

## 2024-11-15 DIAGNOSIS — M25.572 ACUTE LEFT ANKLE PAIN: ICD-10-CM

## 2024-11-15 PROCEDURE — 97110 THERAPEUTIC EXERCISES: CPT | Mod: GP | Performed by: PHYSICAL THERAPIST

## 2024-11-15 PROCEDURE — 97140 MANUAL THERAPY 1/> REGIONS: CPT | Mod: GP | Performed by: PHYSICAL THERAPIST

## 2024-11-15 NOTE — PROGRESS NOTES
"PHYSICAL THERAPY   TREATMENT NOTE    Patient Name:  Va Finn   Patient MRN: [id number]  Date: 11/15/2024    Time Calculation  Start Time: 0847  Stop Time: 0929  Time Calculation (min): 42 min    Insurance:  Insurance Type: [place]  Visit number: 7    Approved # of visits 60  Authorization Needed: No     General   Reason for visit: L Achilles repair 8/30/24   Referred by: Tony Lay       Therapy diagnoses:   Problem List Items Addressed This Visit             ICD-10-CM    Acute left ankle pain M25.572         Assessment:  Pt continues to do well, somewhat more sore than normal today so encouraged her to take it easy with the heel raises if she does note increased soreness to allow things to calm down before pushing it again.    Plan: Continue progressing LE strength as tolerated      Subjective  Is a little more sore this week from trying to do a lot of heel raises. Got on her bike and it felt okay.   - Pain (0-10): 1-2/10 ankle soreness    Precautions - 11 weeks post op on 11/15  6-12 weeks: wean from boot weeks 8-10 (11/22 is week 12), theraband exercises  12 weeks: progress strength as tolerated  PMH: none  Fall risk -  low 2/2 surgical status    Objective  HEP Access Code: [email or web address] (seated toe flex/ext PROM, ankle 4 way ROM, S/L hip add, bridge, SLS, squats, standing hip abd/ext green, heel raises)  Treatment Performed:   Therapeutic Exercise:    - R bike L5 x 7'  - dynamic LE - KTC, quads, toe touch, fig 4   - Bosu hip abduction x 60\" R/L  - SB bridge with hs curl x 3 x 10  - plank alternating hip ext x 20    Manual Therapy:   STM/DTM L Achilles, gastroc, soleus       PT Therapeutic Procedures Time Entry  Manual Therapy Time Entry: 12  Therapeutic Exercise Time Entry: 30                  "

## 2024-11-27 ENCOUNTER — TREATMENT (OUTPATIENT)
Dept: PHYSICAL THERAPY | Facility: CLINIC | Age: 39
End: 2024-11-27
Payer: COMMERCIAL

## 2024-11-27 DIAGNOSIS — M25.572 ACUTE LEFT ANKLE PAIN: Primary | ICD-10-CM

## 2024-11-27 PROCEDURE — 97110 THERAPEUTIC EXERCISES: CPT | Mod: GP | Performed by: PHYSICAL THERAPIST

## 2024-11-27 NOTE — PROGRESS NOTES
"PHYSICAL THERAPY   TREATMENT NOTE    Patient Name:  Va Finn   Patient MRN: [id number]  Date: 11/27/2024    Time Calculation  Start Time: 1100  Stop Time: 1143  Time Calculation (min): 43 min    Insurance:  Insurance Type: [place]  Visit number: 8    Approved # of visits 60  Authorization Needed: No     General   Reason for visit: L Achilles repair 8/30/24   Referred by: Tony Lay       Therapy diagnoses:   Problem List Items Addressed This Visit             ICD-10-CM    Acute left ankle pain - Primary M25.572         Assessment:  Pt notices slight decreased heel strike on L during treadmill. Noted some redness/bruising around distal and proximal ends of incision but no other signs of infection. Educated pt on monitoring signs of infection. Challenged with SL balance on uneven surfaces. Added mini squat jumps to HEP - watching to not favor non op side and pushing through toes.    Plan: Continue progressing LE strength as tolerated,       Subjective  Limping less pronounced these days. Notices more inflammation when she does her calf raises. Added weight to her dumbbell squats. Might go back to yoga next week.  - Pain (0-10): 0/10    Precautions - 13 weeks post op on 11/29  12 weeks: progress strength as tolerated  PMH: none  Fall risk -  low 2/2 surgical status    Objective  HEP Access Code: [email or web address] (seated toe flex/ext PROM, ankle 4 way ROM, S/L hip add, bridge, SLS, squats, standing hip abd/ext green, heel raises)  Treatment Performed:   Therapeutic Exercise:    - Treadmill walk x 5' , sidestepping x 2' R/L (1.2mph), speed walk (3.0mph) x 3'  - Bosu step ups x 30 alternating  - airex lunge <> high knee x 15 R/L  - SL stool scoots x 60' R/L x 2  - airex lateral lunge slider x 15 R/L  - squat mini jumps x 10 x 3  - Bosu blue DL balance rebounder with blue ball x 15 // airex SLS blue rebounder x 15 R/L  - SLS on Bosu blue side x 30\" R/L x 2  - gastroc stretch on slant board x 30\" x 2  - soleus stretch on slant " "board x 30\" x 2       PT Therapeutic Procedures Time Entry  Therapeutic Exercise Time Entry: 43                  "

## 2024-12-06 ENCOUNTER — APPOINTMENT (OUTPATIENT)
Dept: PHYSICAL THERAPY | Facility: CLINIC | Age: 39
End: 2024-12-06
Payer: COMMERCIAL

## 2024-12-09 ENCOUNTER — OFFICE VISIT (OUTPATIENT)
Dept: ORTHOPEDIC SURGERY | Facility: CLINIC | Age: 39
End: 2024-12-09
Payer: COMMERCIAL

## 2024-12-09 DIAGNOSIS — S86.012A ACHILLES TENDON TEAR, LEFT, INITIAL ENCOUNTER: Primary | ICD-10-CM

## 2024-12-09 PROCEDURE — 99212 OFFICE O/P EST SF 10 MIN: CPT | Performed by: ORTHOPAEDIC SURGERY

## 2024-12-09 PROCEDURE — 1036F TOBACCO NON-USER: CPT | Performed by: ORTHOPAEDIC SURGERY

## 2024-12-09 NOTE — PROGRESS NOTES
History of Present Illness   The patient returns today denying any significant pain and notes improvement in pain and swelling.     Review of Systems   GENERAL: Negative for malaise, significant weight loss, fever  MUSCULOSKELETAL: see HPI  NEURO:  Negative    Physical Examination:  Incision healed nicely  Achilles tendon palpable throughout its course.  + Plantar flexion with gentle Art´s test  Sensation intact throughout foot  Good cap refill    Assessment:   Patient status post left Achilles tendon repair.    Plan:  Discussed activity level / timeframe for transition   Reviewed risk of re-injury / activities to avoid  Discussed rehab goals / PT protocol and protracted course to regain strength  Follow-up: 3 months

## 2024-12-13 ENCOUNTER — TREATMENT (OUTPATIENT)
Dept: PHYSICAL THERAPY | Facility: CLINIC | Age: 39
End: 2024-12-13
Payer: COMMERCIAL

## 2024-12-13 DIAGNOSIS — M25.572 ACUTE LEFT ANKLE PAIN: ICD-10-CM

## 2024-12-13 PROCEDURE — 97110 THERAPEUTIC EXERCISES: CPT | Mod: GP | Performed by: PHYSICAL THERAPIST

## 2024-12-13 NOTE — PROGRESS NOTES
"PHYSICAL THERAPY   TREATMENT NOTE/RECHECK    Patient Name:  Va Finn   Patient MRN: [id number]  Date: 12/13/2024    Time Calculation  Start Time: 0800  Stop Time: 0838  Time Calculation (min): 38 min    Insurance:  Insurance Type: [place]  Visit number: 9    Approved # of visits 60  Authorization Needed: No     General   Reason for visit: L Achilles repair 8/30/24   Referred by: Tony Lay       Therapy diagnoses:   Problem List Items Addressed This Visit             ICD-10-CM    Acute left ankle pain M25.572       Assessment:  Pt is progressing nicely at this time with normal ankle ROM, improving ankle and hip strength, but continued limitations with running and jumping, as expected 15 weeks post op Achilles repair. Encouraged her to return to running with couch to 5k program avoiding compensation, as well as progressing jumping from 2 feet to 1 foot.     Plan: Continue progressing LE strength as tolerated, including running, jumping, pivoting, cutting. 1x/ month for 2-3 more sessions.  Goals  1. Pt will be independent in HEP in 6-8 weeks - MET  2. Pt will report 0-1/10 L ankle pain at rest and with activity in 6-8 weeks. - MET  3. Pt will demonstrate 5/5 B LE strength to return to running in 12-16 weeks - PROGRESSING  4. Pt will demonstrate full L ankle ROM to normalize gait in 8-12 weeks.- MET  5. Pt will report LEFS score > 70/80 in 12-16 weeks. -  6. Pt will be able to SLS on surgical leg for > 10\" in 6-8 weeks to decrease fall risk. - MET    NEW GOALS  Pt will be independent in HEP in 2-3 months  Pt will demonstrate SL hop test > 85% of non operative leg in 2-3 months  Pt will demonstrate triple hop test > 85% of non operative leg in 2-3 months  Pt will report LEFS score > 75/80 in 2-3 months.      Subjective  Her back almost went out the other week so she stopped doing exercises for a little bit. Hasn't gotten back to jogging yet or any sport activity. Did go line dancing   - Pain (0-10): 0/10 no pain at " "this point.     Precautions - 15 weeks post op on 12/13  PMH: none  Fall risk - none    Objective IE// TODAY  HEP Access Code: [email or web address] (seated toe flex/ext PROM, ankle 4 way ROM, S/L hip add, bridge, SLS, squats, standing hip abd/ext green, heel raises)  Gait Ambulates with boot PWB and B axillary crutches // Ambulate IND, non antalgic     Range of Motion (R, L in degrees)  Ankle Dorsiflexion 20 , 0  // 20, 27  Ankle Plantarflexion 55 ,45 // 55, 60  Ankle Inversion 40, 40 //  40, 45  Ankle Eversion  15, 15 // 15, 15     Flexibility (R, L)  Hamstrings WNL, min dec // WNL, WNL  Quads min dec, min dec // min dec B     Strength (R, L MMT out of 5)  Hip Extension 5, 4+ // 5, 5  Hip Abduction 4+, 4 // 5, 5     Outcome Measures  LEFS = 45/80 // 66/80  8\" lateral step down test NT //  R 25, L 27    Treatment Performed:   Therapeutic Exercise:    - Treadmill walk (2.8 mph) x 5'  - dynamic LE - KTC, quads, toe touch, lunge, lateral lunge  - jogging x 30' x 8  - side shuffles x 30' R/L x 2  - 8\" lateral step down x 30\" R/L  - squat jumps forward x 30'  - recheck  - agility ladder x 10'  - gastroc stretch on slant board x 30\" x 3       PT Therapeutic Procedures Time Entry  Therapeutic Exercise Time Entry: 38                  "

## 2025-01-17 ENCOUNTER — TREATMENT (OUTPATIENT)
Dept: PHYSICAL THERAPY | Facility: CLINIC | Age: 40
End: 2025-01-17
Payer: COMMERCIAL

## 2025-01-17 DIAGNOSIS — M25.572 ACUTE LEFT ANKLE PAIN: ICD-10-CM

## 2025-01-17 PROCEDURE — 97110 THERAPEUTIC EXERCISES: CPT | Mod: GP | Performed by: PHYSICAL THERAPIST

## 2025-01-17 PROCEDURE — 97140 MANUAL THERAPY 1/> REGIONS: CPT | Mod: GP | Performed by: PHYSICAL THERAPIST

## 2025-01-17 NOTE — PROGRESS NOTES
PHYSICAL THERAPY   TREATMENT NOTE/RECHECK    Patient Name:  Va Finn   Patient MRN: [id number]  Date: 1/17/2025    Time Calculation  Start Time: 1104  Stop Time: 1143  Time Calculation (min): 39 min    Insurance:  Insurance Type: [place]  Visit number: 10    Approved # of visits 60  Authorization Needed: No     General   Reason for visit: L Achilles repair 8/30/24   Referred by: Tony Lay       Therapy diagnoses:   Problem List Items Addressed This Visit             ICD-10-CM    Acute left ankle pain M25.572       Assessment:  Pt is progressing nicely at this time despite R Achilles aggravation - feels better after manual today. Minimal pain with jump testing noted in R Achilles not L. Encouraged her to continue eccentric SL heel raises and stretches, and couch to 5k program if pain free. Does well today with return to sport testing - however this may be slightly skewed with R Achilles bothering her.     Plan: Continue progressing LE strength as tolerated, including running, jumping, pivoting, cutting. 1x/ month for 2-3 more sessions.      Subjective  Started couch to 5k and was going well until she got to 3 min on /2  min off and started feeling some shin splints so she stayed at that level but then got sick and took a break. Was in [place] and did some running 2 on / 2off, and then noticed her R Achilles was really sore and tender so she backed off everything. Has noticed that her R Achilles has been stopping her more than her L.  - Pain (0-10): L hasn't had much pain, R hurts right on her Achilles.    Precautions - 20 weeks post op on 1/17  PMH: none  Fall risk - none    Objective   HEP Access Code: [email or web address] (seated toe flex/ext PROM, ankle 4 way ROM, S/L hip add, bridge, SLS, squats, standing hip abd/ext green, heel raises)  Single Hop Test -> 84.7% of non operative leg  R 127cm, 138cm, 141cm = AVG = 135.3  L 97cm, 121cm, 126cm = AVG = 114.6    Triple Hop Test AVG 88.7% of non operative leg  R 432cm,  409cm, 439cm = .6  L 332cm, 410cm, 394cm = .6    Treatment Performed:   Therapeutic Exercise:    - Treadmill walk (3.1mph) x 5' // treadmill jog x 2'  - SL hip hinge airex x 10 R/L  - SL heel raise eccentric x 15 R/L  - single hop test   - triple hop test         PT Therapeutic Procedures Time Entry  Manual Therapy Time Entry: 14  Therapeutic Exercise Time Entry: 25

## 2025-02-14 ENCOUNTER — TREATMENT (OUTPATIENT)
Dept: PHYSICAL THERAPY | Facility: CLINIC | Age: 40
End: 2025-02-14
Payer: COMMERCIAL

## 2025-02-14 DIAGNOSIS — M25.572 ACUTE LEFT ANKLE PAIN: ICD-10-CM

## 2025-02-14 PROCEDURE — 97110 THERAPEUTIC EXERCISES: CPT | Mod: GP | Performed by: PHYSICAL THERAPIST

## 2025-02-14 NOTE — PROGRESS NOTES
PHYSICAL THERAPY   TREATMENT NOTE    Patient Name:  Va Finn   Patient MRN: [id number]  Date: 2/14/2025    Time Calculation  Start Time: 0800  Stop Time: 0841  Time Calculation (min): 41 min    Insurance:  Insurance Type: [place]  Visit number: 11    Approved # of visits 60  Authorization Needed: No     General   Reason for visit: L Achilles repair 8/30/24   Referred by: Tony Lay       Therapy diagnoses:   Problem List Items Addressed This Visit             ICD-10-CM    Acute left ankle pain M25.572       Assessment:  Pt doing very well today with all return to sport hop tests scoring over 90%. Educated her that she can return to some solo drills for soccer but no games or scrimmages with other players until 9 months post op. Will continue with hop/jump power and stability.    Plan: Hold chart for 6 weeks and discharge if pt continues to do well with return to sport on her own.      Subjective  Has been running 2 miles with intermittent walking. L calf has been cramping a little bit. Is at the gym 2x/week and still has some calf weakness but otherwise feeling good. Feels some fatigue in her shin  - Pain (0-10): L hasn't had much pain, R hurts right on her Achilles.    Precautions - 4.5 months post op   PMH: none  Fall risk - none    Objective   HEP Access Code: [email or web address] (seated toe flex/ext PROM, ankle 4 way ROM, S/L hip add, bridge, SLS, squats, standing hip abd/ext green, heel raises)  Single Hop Test -> 93.3% of non operative leg  R 153cm, 145cm, 154cm AVG = 150.7  L 137cm, 138cm, 147cm AVG = 140.7    Triple Hop Test AVG 91.2% of non operative leg  R 434cm, 448cm, 425cm= .7  L  398cm, 382cm, 413cm= .7    6m Hop Test 96% of non operative leg  R 2.71, 2.64, 2.63 AVG = 2.66  L 2.64, 2.77, 2.89 AVG = 2.77    Lateral Hop Test 94% of non operative leg  R 357cm, 354cm, 358cm AVG = 356.3  L 346cm, 328cm, 334cm AVG = 336    Treatment Performed:   Therapeutic Exercise:    -  Dynamic LE: KTC, keenan, toe  "touches, lunges, lateral lunges, figure 4  - lunge <> high knee on airex x 10 R/L   - Bosu lateral up and over shuffles x 60\"  - single hop test  - triple hop test  - 6m hop test  - lateral hop test  - 8\" box jumps / SL box jumps  - gastroc stretch on slant board x 30\"  - soleus stretch on slant board x 30\"  - standing quad stretch x 30\" R/L  - standing hs stretch x 30\" R/L           PT Therapeutic Procedures Time Entry  Therapeutic Exercise Time Entry: 41                  "

## 2025-07-21 ENCOUNTER — OFFICE VISIT (OUTPATIENT)
Dept: URGENT CARE | Age: 40
End: 2025-07-21
Payer: COMMERCIAL

## 2025-07-21 ENCOUNTER — HOSPITAL ENCOUNTER (OUTPATIENT)
Dept: RADIOLOGY | Facility: CLINIC | Age: 40
Discharge: HOME | End: 2025-07-21
Payer: COMMERCIAL

## 2025-07-21 VITALS
OXYGEN SATURATION: 99 % | DIASTOLIC BLOOD PRESSURE: 87 MMHG | HEART RATE: 78 BPM | SYSTOLIC BLOOD PRESSURE: 118 MMHG | RESPIRATION RATE: 16 BRPM | TEMPERATURE: 98.6 F

## 2025-07-21 DIAGNOSIS — S62.634A CLOSED FRACTURE OF TUFT OF DISTAL PHALANX OF RIGHT RING FINGER: Primary | ICD-10-CM

## 2025-07-21 DIAGNOSIS — S69.91XA FINGER INJURY, RIGHT, INITIAL ENCOUNTER: ICD-10-CM

## 2025-07-21 PROCEDURE — 99204 OFFICE O/P NEW MOD 45 MIN: CPT | Performed by: NURSE PRACTITIONER

## 2025-07-21 PROCEDURE — 73140 X-RAY EXAM OF FINGER(S): CPT | Mod: RT

## 2025-07-21 PROCEDURE — 1036F TOBACCO NON-USER: CPT | Performed by: NURSE PRACTITIONER

## 2025-07-21 ASSESSMENT — ENCOUNTER SYMPTOMS: ARTHRALGIAS: 1

## 2025-07-21 ASSESSMENT — PAIN SCALES - GENERAL: PAINLEVEL_OUTOF10: 6

## 2025-07-21 NOTE — PROGRESS NOTES
Subjective   Patient ID: Va Finn is a 40 y.o. female. They present today with a chief complaint of Finger Injury (Right 4th finger injury X 3 wks ago. Pt states she jammed finger while playing flag football. ).    History of Present Illness  HPI    Patient presents to urgent care for complaints of right fourth finger injury. Patient states she was playing flag football and accidentally jammed her finger. Injury happened 3 weeks ago.     Past Medical History  Allergies as of 07/21/2025 - Reviewed 07/21/2025   Allergen Reaction Noted    Codeine Nausea/vomiting 08/30/2024       Prescriptions Prior to Admission[1]     Medical History[2]    Surgical History[3]     reports that she has never smoked. She has never used smokeless tobacco. She reports current alcohol use. She reports that she does not use drugs.    Review of Systems  Review of Systems   Musculoskeletal:  Positive for arthralgias.                                  Objective    Vitals:    07/21/25 1506   BP: 118/87   Pulse: 78   Resp: 16   Temp: 37 °C (98.6 °F)   SpO2: 99%     Patient's last menstrual period was 06/28/2025 (exact date).    Physical Exam  Vitals reviewed.   Constitutional:       Appearance: Normal appearance.     Musculoskeletal:      Right hand: Swelling (right ringer finger) and tenderness (right ring finger at dip joint) present. Normal range of motion. Normal sensation. There is no disruption of two-point discrimination. Normal capillary refill. Normal pulse.     Neurological:      Mental Status: She is alert.         Procedures    Point of Care Test & Imaging Results from this visit  No results found for this visit on 07/21/25.   Imaging  No results found.    Cardiology, Vascular, and Other Imaging  No other imaging results found for the past 2 days      Diagnostic study results (if any) were reviewed by PRIYA Hartley.    Assessment/Plan   Allergies, medications, history, and pertinent labs/EKGs/Imaging reviewed by  PRIYA Hartley.     Medical Decision Making  +fracture at distal phalangeal tuft. Patient has been wearing a splint. She was told to continue to wear splint and follow up with orthopedic.    At time of discharge patient was clinically well-appearing and HDS for outpatient management. The patient and/or family was educated regarding diagnosis, supportive care, OTC and Rx medications. The patient and/or family was given the opportunity to ask questions prior to discharge.  They verbalized understanding of my discussion of the plans for treatment, expected course, indications to return to  or seek further evaluation in ED, and the need for timely follow up as directed.   They were provided with a work/school excuse if requested.      Orders and Diagnoses  Diagnoses and all orders for this visit:  Closed fracture of tuft of distal phalanx of right ring finger  -     Referral to Orthopedics and Sports Medicine; Future  Finger injury, right, initial encounter  -     XR fingers right 2+ views; Future      Medical Admin Record      Patient disposition: Home    Electronically signed by PRIYA Hartley  3:12 PM           [1] (Not in a hospital admission)  [2]   Past Medical History:  Diagnosis Date    Other fecal abnormalities 11/08/2018    Loose stools   [3]   Past Surgical History:  Procedure Laterality Date    OTHER SURGICAL HISTORY  05/16/2019    Clavicle fracture repair

## 2025-07-22 ENCOUNTER — TELEPHONE (OUTPATIENT)
Dept: URGENT CARE | Age: 40
End: 2025-07-22

## 2025-07-24 ENCOUNTER — OFFICE VISIT (OUTPATIENT)
Dept: ORTHOPEDIC SURGERY | Facility: CLINIC | Age: 40
End: 2025-07-24
Payer: COMMERCIAL

## 2025-07-24 DIAGNOSIS — S63.694S: Primary | ICD-10-CM

## 2025-07-24 PROCEDURE — 1036F TOBACCO NON-USER: CPT | Performed by: ORTHOPAEDIC SURGERY

## 2025-07-24 PROCEDURE — 99213 OFFICE O/P EST LOW 20 MIN: CPT | Performed by: ORTHOPAEDIC SURGERY

## 2025-07-24 NOTE — PROGRESS NOTES
Jammed her right ring finger playing ball sore swollen she went to the ER got some x-rays and look like she had an old tuft fracture she has been splinted she is still sore just wants to inquire as to what her expectation is  See intake sheet which was reviewed and scanned in the chart  Constitutional: Well-developed well-nourished   Eyes: Sclerae anicteric, pupils equal and round  HENT: Normocephalic atraumatic  Cardiovascular: Pulses full, regular rate and rhythm  Respiratory: Breathing not labored, no wheezing  Integumentary: Skin intact, no lesions or rashes  Neurological: Sensation intact, no gross strength deficits, reflexes equal  Psychiatric: Alert oriented and appropriate  Hematologic/lymphatic: No lymphadenopathy  Right hand: Some swelling around the PIP joint of the ring finger is able to get the full extension with help she lacks about 30 to 40 degrees of flexion there is no angular deformity and tendon function is intact  X-rays of the PIP joint are negative impression likely PIP sprain subluxation explained this typically stay sore for quite some time I just recommend buddy taping to prevent reinjury and work on mobility discussed reasonable expectations all questions answered follow-up as needed

## (undated) DEVICE — SUTURE, VICRYL, 3-0, 27 IN, SH

## (undated) DEVICE — APPLICATOR, CHLORAPREP, W/ORANGE TINT, 26ML

## (undated) DEVICE — SUTURE, ETHILON, 3-0, 18 IN, PS2, BLACK

## (undated) DEVICE — GOWN, ASTOUND, XL

## (undated) DEVICE — PADDING, WEBRIL, UNDERCAST, STERILE, 6 IN

## (undated) DEVICE — DRESSING, GAUZE, 16 PLY, 4 X 4 IN, STERILE

## (undated) DEVICE — SUTURE, FIBERWIRE 2, T-5 TAPER NEEDLE, 38"

## (undated) DEVICE — SOLUTION, IRRIGATION, SODIUM CHLORIDE 0.9%, 1000 ML, POUR BOTTLE

## (undated) DEVICE — DRESSING, ABDOMINAL, WET PRUF, TENDERSORB, 5 X 9 IN, STERILE

## (undated) DEVICE — FIBERTAPE, CERCLAGE, BLUE, W/TIGERLINK

## (undated) DEVICE — SPONGE, LAP, XRAY DECT, 18IN X 18IN, W/MASTER DMT, STERILE

## (undated) DEVICE — SYRINGE, 35 CC, LUER LOCK, MONOJECT, W/O CAP, LF

## (undated) DEVICE — GLOVE, SURGICAL, PROTEXIS PI , 7.5, PF, LF

## (undated) DEVICE — SPLINT, FAST SETTING, 5 X 30 IN, PLASTER

## (undated) DEVICE — DRAPE, SHEET, U, STERI DRAPE, 47 X 51 IN, DISPOSABLE, STERILE

## (undated) DEVICE — PADDING, WEBRIL, UNDERCAST, STERILE, 4 IN

## (undated) DEVICE — SOLUTION, IRRIGATION, STERILE WATER, 1000 ML, POUR BOTTLE

## (undated) DEVICE — Device

## (undated) DEVICE — BANDAGE, ESMARK, 6 IN X 12 FT

## (undated) DEVICE — WOUND SYSTEM, DEBRIDEMENT & CLEANING, O.R DUOPAK

## (undated) DEVICE — CAP, RETRACTOR, GELPI, 2IN LONG

## (undated) DEVICE — GLOVE, SURGICAL, PROTEXIS PI BLUE W/NEUTHERA, 7.5, PF, LF

## (undated) DEVICE — GLOVE, SURGICAL, PROTEXIS PI W/NEU-THERA, 8.0, PF, LF

## (undated) DEVICE — SUTURE, VICRYL, 2-0, 27 IN, X-1, UNDYED

## (undated) DEVICE — BANDAGE, COMPRESSION, W/CLIP, FLEX-MASTER, DOUBLE LENGTH, 6 IN X 11 YD, LF